# Patient Record
Sex: MALE | Race: WHITE
[De-identification: names, ages, dates, MRNs, and addresses within clinical notes are randomized per-mention and may not be internally consistent; named-entity substitution may affect disease eponyms.]

---

## 2019-09-07 ENCOUNTER — HOSPITAL ENCOUNTER (EMERGENCY)
Dept: HOSPITAL 25 - ED | Age: 24
Discharge: HOME | End: 2019-09-07
Payer: COMMERCIAL

## 2019-09-07 VITALS — DIASTOLIC BLOOD PRESSURE: 86 MMHG | SYSTOLIC BLOOD PRESSURE: 116 MMHG

## 2019-09-07 DIAGNOSIS — H81.399: Primary | ICD-10-CM

## 2019-09-07 DIAGNOSIS — Z79.899: ICD-10-CM

## 2019-09-07 DIAGNOSIS — F41.9: ICD-10-CM

## 2019-09-07 LAB
ALBUMIN SERPL BCG-MCNC: 4.7 G/DL (ref 3.2–5.2)
ALBUMIN/GLOB SERPL: 2 {RATIO} (ref 1–3)
ALP SERPL-CCNC: 62 U/L (ref 34–104)
ALT SERPL W P-5'-P-CCNC: 17 U/L (ref 7–52)
ANION GAP SERPL CALC-SCNC: 7 MMOL/L (ref 2–11)
AST SERPL-CCNC: 17 U/L (ref 13–39)
BASOPHILS # BLD AUTO: 0 10^3/UL (ref 0–0.2)
BUN SERPL-MCNC: 9 MG/DL (ref 6–24)
BUN/CREAT SERPL: 10.6 (ref 8–20)
CALCIUM SERPL-MCNC: 9.6 MG/DL (ref 8.6–10.3)
CHLORIDE SERPL-SCNC: 105 MMOL/L (ref 101–111)
EOSINOPHIL # BLD AUTO: 0.1 10^3/UL (ref 0–0.6)
GLOBULIN SER CALC-MCNC: 2.4 G/DL (ref 2–4)
GLUCOSE SERPL-MCNC: 106 MG/DL (ref 70–100)
HCO3 SERPL-SCNC: 26 MMOL/L (ref 22–32)
HCT VFR BLD AUTO: 45 % (ref 42–52)
HGB BLD-MCNC: 15.3 G/DL (ref 14–18)
LYMPHOCYTES # BLD AUTO: 0.8 10^3/UL (ref 1–4.8)
MCH RBC QN AUTO: 30 PG (ref 27–31)
MCHC RBC AUTO-ENTMCNC: 34 G/DL (ref 31–36)
MCV RBC AUTO: 88 FL (ref 80–94)
MONOCYTES # BLD AUTO: 0.4 10^3/UL (ref 0–0.8)
NEUTROPHILS # BLD AUTO: 3.8 10^3/UL (ref 1.5–7.7)
NRBC # BLD AUTO: 0 10^3/UL
NRBC BLD QL AUTO: 0.1
PLATELET # BLD AUTO: 178 10^3/UL (ref 150–450)
POTASSIUM SERPL-SCNC: 3.8 MMOL/L (ref 3.5–5)
PROT SERPL-MCNC: 7.1 G/DL (ref 6.4–8.9)
RBC # BLD AUTO: 5.07 10^6 /UL (ref 4.18–5.48)
SODIUM SERPL-SCNC: 138 MMOL/L (ref 135–145)
WBC # BLD AUTO: 5.1 10^3/UL (ref 3.5–10.8)

## 2019-09-07 PROCEDURE — 80053 COMPREHEN METABOLIC PANEL: CPT

## 2019-09-07 PROCEDURE — 99282 EMERGENCY DEPT VISIT SF MDM: CPT

## 2019-09-07 PROCEDURE — 85025 COMPLETE CBC W/AUTO DIFF WBC: CPT

## 2019-09-07 PROCEDURE — 36415 COLL VENOUS BLD VENIPUNCTURE: CPT

## 2019-09-07 NOTE — ED
Dizziness





- HPI Summary


HPI Summary: 





23 year old M presents to [facility] with a chief complaint of dizziness since last 

night, 9/6/19. Symptoms aggravated when moving from sitting down or standing up 

to lying down and vice versa. Symptoms alleviated by nothing.


Patient reports dizziness late last night when he moves from sitting or 

standing to lying down and vice versa but reports that he is fine when he stays 

in those positions for long periods of time. Patient reports that his head 

would spin and his vision would move back and forth. Patient reports nausea and 

tingling in the arm described like blood leaving his arm but denies vomiting 

and pain or ringing in his ears. Patient visited Urgent Care where a vertigo 

test was done and revealed to be negative. Patient reports when he lays back 

further his eyes swing to the left but this morning they were swinging to the 

right. Patient reports slightly similar symptoms previously but never 

consistently like this. Patient reports temperature fluctuations where he would 

get really hot and then really cold. Patient denies any recent colds though he 

moved to a new apartment in the basement.  Mhx anxiety disorder though he does 

not think current symptoms are from his anxiety.  SHx wisdom teeth removal. 

Patient denies allergies to medicines, tobacco use, drug use (but takes lexopro 

10 mg). Patient reports occasional alcohol. 








- History Of Current Complaint


Chief Complaint: EDDizziness


Stated Complaint: DIZZINESS AND SHAKING PER PT


Time Seen by Provider: 09/07/19 16:28


Hx Obtained From: Patient


Onset/Duration: Still Present


Character: Head Spinning


Aggravating Factor(s): Other - movement from laying down to standing or sitting 

and vice versa


Alleviating Factor(s): Other - staying in [facility]


Associated Signs And Symptoms: Positive: Nausea, Other: - temperature 

fluctuations, tingling in arm.  Negative: Vomiting, Tinnitus





- Allergies/Home Medications


Allergies/Adverse Reactions: 


 Allergies











Allergy/AdvReac Type Severity Reaction Status Date / Time


 


No Known Allergies Allergy   Verified 09/07/19 16:22











Home Medications: 


 Home Medications





Escitalopram Oxalate [Lexapro 10 mg] 10 mg PO DAILY 09/07/19 [History Confirmed 

09/07/19]











PMH/Surg Hx/FS Hx/Imm Hx


Sensory History: Reports: Hx Contacts or Glasses


Opthamlomology History: Reports: Hx Contacts or Glasses


Psychiatric History: Reports: Hx Anxiety





- Surgical History


Surgery Procedure, Year, and Place: wisdom teeth removal


Infectious Disease History: No


Infectious Disease History: 


   Denies: Traveled Outside the [place] in Last 30 Days





- Family History


Known Family History: Positive: Unknown





- Social History


Alcohol Use: Occasionally


Hx Substance Use: No


Substance Use Type: Reports: None


Hx Tobacco Use: No


Smoking Status (MU): Never Smoked Tobacco





Review of Systems


Constitutional: Other - temperature fluctuations


ENT: Other - moving vision; denies tinnitus


Positive: Nausea.  Negative: Vomiting


Musculoskeletal: Other - tingling in arms


Neurological: Other - dizziness


All Other Systems Reviewed And Are Negative: Yes





Physical Exam





- Summary


Physical Exam Summary: 








Constitutional: Well-developed, Well-nourished, Alert. (-) Distressed


Skin: Warm, Dry


HENT: Normocephalic; Atraumatic 


Eyes: Conjunctiva normal


Neck: Musculoskeletal ROM normal neck. (-) JVD, (-) Stridor, (-) Tracheal 

deviation


Cardio: Rhythm regular, rate normal, Heart sounds normal; Intact distal pulses; 

The pedal pulses are 2+ and symmetric. Radial pulses are 2+ and symmetric. (-) 

Murmur


Pulmonary/Chest wall: Effort normal. (-) Respiratory distress, (-) Wheezes, (-) 

Rales


Abd: Soft. (-) Tenderness,  (-) Distension, (-) Guarding, (-) Rebound


Musculoskeletal: (-) Edema


Lymph: (-) Cervical adenopathy


Neuro: Alert, Oriented x3, Strength normal, Cranial nerves II-XII are grossly 

intact. (-) Dysmetria, Nystagmus with left gaze, (-) Ataxia by finger to nose 

testing, (-) Sensory deficit. Able to ambulate with a normal gait.


NIH stroke scale:0 


Psych: Mood and affect Normal





Triage Information Reviewed: Yes


Vital Signs On Initial Exam: 


 Initial Vitals











Temp Pulse Resp BP Pulse Ox


 


 97.9 F   77   18   116/86   99 


 


 09/07/19 16:12  09/07/19 16:12  09/07/19 16:12  09/07/19 16:12  09/07/19 16:12











Vital Signs Reviewed: Yes





Diagnostics





- Vital Signs


 Vital Signs











  Temp Pulse Resp BP Pulse Ox


 


 09/07/19 16:12  97.9 F  77  18  116/86  99














- Laboratory


Result Diagrams: 


 09/07/19 16:50





 09/07/19 16:50


Lab Statement: Any lab studies that have been ordered have been reviewed, and 

results considered in the medical decision making process.





Dizzy Course/Dx





- Course


Course Of Treatment: Patient is here with symptoms consistent with peripheral 

vertigo.  Patient has no red flag symptoms of vertigo.  Patient is a normal 

neurologic exam and is able to ambulate without difficulty.  Patient had an NIH 

stroke scale of 0.  Patient initially was asymptomatic and wanted blood test 

checked as he is very anxious about his electroltytes.  Patient had normal 

blood tests here.  Patient was discharged on meclizine.





- Diagnoses


Provider Diagnoses: 


 Peripheral vertigo








Discharge ED





- Sign-Out/Discharge


Documenting (check all that apply): Patient Departure - discharge


Patient Received Moderate/Deep Sedation with Procedure: No





- Discharge Plan


Condition: Stable


Disposition: HOME


Prescriptions: 


Meclizine TAB* [Antivert 12.5 TAB*] 25 mg PO TID PRN #20 tab


 PRN Reason: Dizziness


Patient Education Materials:  Vertigo (ED)


Referrals: 


[facility] [Outside] - As Soon As Possible


Additional Instructions: 


Take medications as prescribed. Come back to ED for any difficulty speaking, 

walking, change in vision, or any other concerning symptoms. 








- Billing Disposition and Condition


Condition: STABLE


Disposition: Home





- Attestation Statements


Document Initiated by Scribe: Yes


Documenting Scribe: Alysha Charles


Provider For Whom Gypsy is Documenting (Include Credential): Dr. Jeovany Felton MD


Scribe Attestation: 


Alysha HARRISON scribed for Dr. Jeovany Felton MD on 09/07/19 at 1949. 


Scribe Documentation Reviewed: Yes


Provider Attestation: 


The documentation as recorded by the Alysha pérez accurately reflects the 

service I personally performed and the decisions made by me, Dr. Jeovany Felton MD


Status of Scribe Document: Viewed